# Patient Record
Sex: FEMALE | Race: WHITE | NOT HISPANIC OR LATINO | URBAN - METROPOLITAN AREA
[De-identification: names, ages, dates, MRNs, and addresses within clinical notes are randomized per-mention and may not be internally consistent; named-entity substitution may affect disease eponyms.]

---

## 2023-09-14 ENCOUNTER — APPOINTMENT (RX ONLY)
Dept: URBAN - METROPOLITAN AREA CLINIC 167 | Facility: CLINIC | Age: 38
Setting detail: DERMATOLOGY
End: 2023-09-14

## 2023-09-14 DIAGNOSIS — L81.1 CHLOASMA: ICD-10-CM | Status: INADEQUATELY CONTROLLED

## 2023-09-14 PROCEDURE — 99203 OFFICE O/P NEW LOW 30 MIN: CPT

## 2023-09-14 PROCEDURE — ? PRESCRIPTION MEDICATION MANAGEMENT

## 2023-09-14 PROCEDURE — ? ADDITIONAL NOTES

## 2023-09-14 PROCEDURE — ? PRESCRIPTION

## 2023-09-14 PROCEDURE — ? COUNSELING

## 2023-09-14 PROCEDURE — ? TREATMENT REGIMEN

## 2023-09-14 RX ORDER — PHARMACY COMPOUNDING ACCESSORY
EACH MISCELLANEOUS QPM
Qty: 30 | Refills: 2 | Status: ERX | COMMUNITY
Start: 2023-09-14

## 2023-09-14 RX ADMIN — Medication: at 00:00

## 2023-09-14 ASSESSMENT — LOCATION DETAILED DESCRIPTION DERM
LOCATION DETAILED: LEFT CENTRAL MALAR CHEEK
LOCATION DETAILED: RIGHT FOREHEAD
LOCATION DETAILED: RIGHT INFERIOR CENTRAL MALAR CHEEK
LOCATION DETAILED: LEFT LATERAL FOREHEAD

## 2023-09-14 ASSESSMENT — LOCATION SIMPLE DESCRIPTION DERM
LOCATION SIMPLE: RIGHT FOREHEAD
LOCATION SIMPLE: LEFT CHEEK
LOCATION SIMPLE: RIGHT CHEEK
LOCATION SIMPLE: LEFT FOREHEAD

## 2023-09-14 ASSESSMENT — SEVERITY ASSESSMENT: SEVERITY: MODERATE, MODERATELY DARKER THAN THE SURROUNDING NORMAL SKIN

## 2023-09-14 ASSESSMENT — LOCATION ZONE DERM: LOCATION ZONE: FACE

## 2023-09-14 NOTE — PROCEDURE: ADDITIONAL NOTES
Render Risk Assessment In Note?: no
Additional Notes: Discussed starting with compound cream QPM as tolerated and ViPeel with Becca. Patient is to apply SPF routinely. \\nDiscussed adding tranexamic acid if melasma is recalcitrant to above treatments. Discussed risks/benefits. Patient will consider.
Detail Level: Simple

## 2023-09-14 NOTE — PROCEDURE: PRESCRIPTION MEDICATION MANAGEMENT
Detail Level: Zone
Render In Strict Bullet Format?: No
Initiate Treatment: pharmacy compounding accessory QPM\\n\\nDelightful Cream: \\nHydroquinone USP 10%\\nKojic Acid USP 2%\\nVit C USP 2.5%\\nTretinoin USP 0.05%\\nHydrocortisone USP 1%\\nHyaluronic Acid EXCP 0.1%

## 2025-07-24 NOTE — PROCEDURE: COUNSELING
EKG reviewed 7/2/25- NSR w septal infarct, age undetermined   -repeat EKG in office -NSR , ST abnormality, cannot rule out Anterior, age undetermined  -EKG 7/8/2025- NSR, anterior infarct, age undetermined   NM stress test- cardiology interpreted 7/22/24  Interpretation Summary  Show Result Comparison     Abnormal myocardial perfusion scan.    There are two significant perfusion abnormalities.    Perfusion Abnormality #1 - There is a moderate intensity, medium sized, equivocal perfusion abnormality that is mostly fixed with some reversible areas. This finding is equivocal due to soft tissue shadow.    Perfusion Abnormality #2 - There is a medium sized, equivocal perfusion abnormality that is fixed in the inferior wall(s). This finding is equivocal due to diaphragm shadow.    There are no other significant perfusion abnormalities.    The gated perfusion images showed an ejection fraction of 71% at rest. The gated perfusion images showed an ejection fraction of 73% post stress.    The ECG portion of the study is negative for ischemia.    The patient reported no chest pain during the stress test.    There were no arrhythmias during stress.    The patient reported no symptoms during the stress test.    The ECG portion of the study is negative for ischemia.    Stress ECG: There is no ST segment deviation identified during the protocol.    Baseline ECG: The Baseline ECG reveals sinus rhythm. The ST segments are normal.    The overall image quality is poor. Abnormal myocardial perfusion scan. There are two significant perfusion abnormalities. First, there is a medium sized, moderate intensity, equivocal perfusion abnormality that is mostly fixed with some reversible areas. This finding is equivocal due to soft tissue shadow. Second, there is a medium sized, equivocal perfusion abnormality that is fixed in the inferior wall(s). This finding is equivocal due to diaphragm shadow.    -Refer to Interventional Cardiology for 
Sunscreen Recommendations: Recommended at least 50 SPF, reapply every 2 hours
further evaluation   
Detail Level: Detailed